# Patient Record
Sex: FEMALE | Race: WHITE | NOT HISPANIC OR LATINO | Employment: OTHER | URBAN - METROPOLITAN AREA
[De-identification: names, ages, dates, MRNs, and addresses within clinical notes are randomized per-mention and may not be internally consistent; named-entity substitution may affect disease eponyms.]

---

## 2017-02-03 ENCOUNTER — ALLSCRIPTS OFFICE VISIT (OUTPATIENT)
Dept: OTHER | Facility: OTHER | Age: 62
End: 2017-02-03

## 2017-02-03 DIAGNOSIS — E55.9 VITAMIN D DEFICIENCY: ICD-10-CM

## 2017-02-03 DIAGNOSIS — R73.01 IMPAIRED FASTING GLUCOSE: ICD-10-CM

## 2017-02-03 DIAGNOSIS — E78.5 HYPERLIPIDEMIA: ICD-10-CM

## 2017-02-03 DIAGNOSIS — M43.10 SPONDYLOLISTHESIS: ICD-10-CM

## 2017-02-13 ENCOUNTER — TRANSCRIBE ORDERS (OUTPATIENT)
Dept: ADMINISTRATIVE | Age: 62
End: 2017-02-13

## 2017-02-13 ENCOUNTER — APPOINTMENT (OUTPATIENT)
Dept: LAB | Age: 62
End: 2017-02-13
Payer: MEDICARE

## 2017-02-13 DIAGNOSIS — M43.10 SPONDYLOLISTHESIS: ICD-10-CM

## 2017-02-13 DIAGNOSIS — R73.01 IMPAIRED FASTING GLUCOSE: ICD-10-CM

## 2017-02-13 DIAGNOSIS — E55.9 VITAMIN D DEFICIENCY: ICD-10-CM

## 2017-02-13 DIAGNOSIS — E78.5 HYPERLIPIDEMIA: ICD-10-CM

## 2017-02-13 LAB
25(OH)D3 SERPL-MCNC: 19.5 NG/ML (ref 30–100)
ALBUMIN SERPL BCP-MCNC: 3.7 G/DL (ref 3.5–5)
ALP SERPL-CCNC: 73 U/L (ref 46–116)
ALT SERPL W P-5'-P-CCNC: 21 U/L (ref 12–78)
ANION GAP SERPL CALCULATED.3IONS-SCNC: 7 MMOL/L (ref 4–13)
AST SERPL W P-5'-P-CCNC: 15 U/L (ref 5–45)
BASOPHILS # BLD AUTO: 0.03 THOUSANDS/ΜL (ref 0–0.1)
BASOPHILS NFR BLD AUTO: 0 % (ref 0–1)
BILIRUB SERPL-MCNC: 0.33 MG/DL (ref 0.2–1)
BUN SERPL-MCNC: 20 MG/DL (ref 5–25)
CALCIUM SERPL-MCNC: 9.3 MG/DL (ref 8.3–10.1)
CHLORIDE SERPL-SCNC: 108 MMOL/L (ref 100–108)
CHOLEST SERPL-MCNC: 273 MG/DL (ref 50–200)
CO2 SERPL-SCNC: 25 MMOL/L (ref 21–32)
CREAT SERPL-MCNC: 1.09 MG/DL (ref 0.6–1.3)
EOSINOPHIL # BLD AUTO: 0.11 THOUSAND/ΜL (ref 0–0.61)
EOSINOPHIL NFR BLD AUTO: 1 % (ref 0–6)
ERYTHROCYTE [DISTWIDTH] IN BLOOD BY AUTOMATED COUNT: 13.3 % (ref 11.6–15.1)
EST. AVERAGE GLUCOSE BLD GHB EST-MCNC: 108 MG/DL
GFR SERPL CREATININE-BSD FRML MDRD: 50.9 ML/MIN/1.73SQ M
GLUCOSE SERPL-MCNC: 107 MG/DL (ref 65–140)
HBA1C MFR BLD: 5.4 % (ref 4.2–6.3)
HCT VFR BLD AUTO: 39.7 % (ref 34.8–46.1)
HDLC SERPL-MCNC: 54 MG/DL (ref 40–60)
HGB BLD-MCNC: 12.7 G/DL (ref 11.5–15.4)
LDLC SERPL CALC-MCNC: 149 MG/DL (ref 0–100)
LYMPHOCYTES # BLD AUTO: 2.49 THOUSANDS/ΜL (ref 0.6–4.47)
LYMPHOCYTES NFR BLD AUTO: 32 % (ref 14–44)
MCH RBC QN AUTO: 30.9 PG (ref 26.8–34.3)
MCHC RBC AUTO-ENTMCNC: 32 G/DL (ref 31.4–37.4)
MCV RBC AUTO: 97 FL (ref 82–98)
MONOCYTES # BLD AUTO: 0.44 THOUSAND/ΜL (ref 0.17–1.22)
MONOCYTES NFR BLD AUTO: 6 % (ref 4–12)
NEUTROPHILS # BLD AUTO: 4.79 THOUSANDS/ΜL (ref 1.85–7.62)
NEUTS SEG NFR BLD AUTO: 61 % (ref 43–75)
NRBC BLD AUTO-RTO: 0 /100 WBCS
PLATELET # BLD AUTO: 322 THOUSANDS/UL (ref 149–390)
PMV BLD AUTO: 9.4 FL (ref 8.9–12.7)
POTASSIUM SERPL-SCNC: 4 MMOL/L (ref 3.5–5.3)
PROT SERPL-MCNC: 7.6 G/DL (ref 6.4–8.2)
RBC # BLD AUTO: 4.11 MILLION/UL (ref 3.81–5.12)
SODIUM SERPL-SCNC: 140 MMOL/L (ref 136–145)
T4 FREE SERPL-MCNC: 0.85 NG/DL (ref 0.76–1.46)
TRIGL SERPL-MCNC: 348 MG/DL
TSH SERPL DL<=0.05 MIU/L-ACNC: 4.26 UIU/ML (ref 0.36–3.74)
WBC # BLD AUTO: 7.87 THOUSAND/UL (ref 4.31–10.16)

## 2017-02-13 PROCEDURE — 80053 COMPREHEN METABOLIC PANEL: CPT

## 2017-02-13 PROCEDURE — 80061 LIPID PANEL: CPT

## 2017-02-13 PROCEDURE — 82306 VITAMIN D 25 HYDROXY: CPT

## 2017-02-13 PROCEDURE — 36415 COLL VENOUS BLD VENIPUNCTURE: CPT

## 2017-02-13 PROCEDURE — 84443 ASSAY THYROID STIM HORMONE: CPT

## 2017-02-13 PROCEDURE — 84439 ASSAY OF FREE THYROXINE: CPT

## 2017-02-13 PROCEDURE — 85025 COMPLETE CBC W/AUTO DIFF WBC: CPT

## 2017-02-13 PROCEDURE — 83036 HEMOGLOBIN GLYCOSYLATED A1C: CPT

## 2017-02-13 PROCEDURE — 86430 RHEUMATOID FACTOR TEST QUAL: CPT

## 2017-02-14 LAB — RHEUMATOID FACT SER QL LA: NEGATIVE

## 2017-03-13 ENCOUNTER — ALLSCRIPTS OFFICE VISIT (OUTPATIENT)
Dept: OTHER | Facility: OTHER | Age: 62
End: 2017-03-13

## 2017-04-14 ENCOUNTER — ALLSCRIPTS OFFICE VISIT (OUTPATIENT)
Dept: OTHER | Facility: OTHER | Age: 62
End: 2017-04-14

## 2017-05-12 ENCOUNTER — ALLSCRIPTS OFFICE VISIT (OUTPATIENT)
Dept: OTHER | Facility: OTHER | Age: 62
End: 2017-05-12

## 2017-05-26 ENCOUNTER — ALLSCRIPTS OFFICE VISIT (OUTPATIENT)
Dept: OTHER | Facility: OTHER | Age: 62
End: 2017-05-26

## 2017-05-26 DIAGNOSIS — M43.10 SPONDYLOLISTHESIS: ICD-10-CM

## 2017-06-28 ENCOUNTER — GENERIC CONVERSION - ENCOUNTER (OUTPATIENT)
Dept: OTHER | Facility: OTHER | Age: 62
End: 2017-06-28

## 2017-06-30 ENCOUNTER — ALLSCRIPTS OFFICE VISIT (OUTPATIENT)
Dept: OTHER | Facility: OTHER | Age: 62
End: 2017-06-30

## 2017-07-27 ENCOUNTER — ALLSCRIPTS OFFICE VISIT (OUTPATIENT)
Dept: OTHER | Facility: OTHER | Age: 62
End: 2017-07-27

## 2017-08-24 ENCOUNTER — ALLSCRIPTS OFFICE VISIT (OUTPATIENT)
Dept: OTHER | Facility: OTHER | Age: 62
End: 2017-08-24

## 2018-01-10 NOTE — PROGRESS NOTES
Assessment    1  Cervical somatic dysfunction (739 1) (M99 01)   2  Thoracic region somatic dysfunction (739 2) (M99 02)   3  Lumbar region somatic dysfunction (739 3) (M99 03)   4  Hyperlipidemia (272 4) (E78 5)    Discussion/Summary    1  Cervical somatic dysfunction  -OMT performed as above, tolerated well  -encouraged hydration to reduce post-treatment soreness  -follow up in 1 month for repeat treatment and re-assessment    2  Thoracic somatic dysfunction  -OMT performed as above, tolerated well  -encouraged hydration to reduce post-treatment soreness  -follow up in 1 month for repeat treatment and re-assessment    3  Lumbar somatic dysfunction  -OMT performed as above, tolerated well  -encouraged hydration to reduce post-treatment soreness  -follow up in 1 month for repeat treatment and re-assessment    4  Hyperlipidemia  -well tolerated, continue Atorvastatin 20mg daily  -total chol 273, HDL 54,  (2/17/17)  -ASCVD risk 7 6%      The patient was counseled regarding instructions for management, risk factor reductions, patient and family education, impressions, risks and benefits of treatment options, importance of compliance with treatment  Possible side effects of new medications were reviewed with the patient/guardian today  The treatment plan was reviewed with the patient/guardian  The patient/guardian understands and agrees with the treatment plan         Self Referrals: No      Chief Complaint  OMT follow visit      History of Present Illness  Patient is a pleasant 58 yo F presenting for follow up visit for OMT  Patient has a h/o degenerative spondylolisthesis and resultant chronic back pain, primarily in low to mid back  Denies bowel or bladder incontinence or saddle paresthesias  She notes stable back tightness/pain, but manageable using combination PT and OMT  At last visit, started patient on Atorvastatin 20mg QHS for HLD   Today she notes no side effects and is taking the medication daily  No other complaints  Review of Systems    Constitutional: no fever and no chills  Cardiovascular: no chest pain and no palpitations  Respiratory: no shortness of breath, no cough, no wheezing and no shortness of breath during exertion  Musculoskeletal: arthralgias, myalgias, lower back pain, mid back pain and upper back pain, but as noted in HPI  ROS reviewed  Active Problems    1  Arthritis (716 90) (M19 90)   2  Cervical somatic dysfunction (739 1) (M99 01)   3  Degenerative spondylolisthesis (738 4) (M43 10)   4  Encounter for smoking cessation counseling (V65 42,305 1) (Z71 6,Z72 0)   5  Fatigue (780 79) (R53 83)   6  Hyperlipidemia (272 4) (E78 5)   7  Impaired fasting glucose (790 21) (R73 01)   8  Lumbar region somatic dysfunction (739 3) (M99 03)   9  Prolapsing Mitral Valve Leaflet Syndrome (424 0)   10  Spinal stenosis (724 00) (M48 00)   11  Subclinical hypothyroidism (244 8) (E03 9)   12  Thoracic region somatic dysfunction (739 2) (M99 02)   13  Vitamin D deficiency (268 9) (E55 9)    Past Medical History    1  History of Ovarian cyst (620 2) (N83 20)    The active problems and past medical history were reviewed and updated today  Surgical History    1  History of Oral Surgery Tooth Extraction    The surgical history was reviewed and updated today  Family History  Maternal Aunt    1  Family history of rheumatoid arthritis (V17 7) (Z82 61)    The family history was reviewed and updated today  Social History    · Current Every Day Smoker (305 1)  The social history was reviewed and updated today  Current Meds   1  Atorvastatin Calcium 20 MG Oral Tablet; TAKE 1 TABLET AT BEDTIME; Therapy: 96TQE0214 to (Simon Medrano)  Requested for: 95HME7123; Last   Rx:07Oar1693 Ordered   2  Fish Oil 1000 MG Oral Capsule; TAKE 1 CAPSULE DAILY; Therapy: 81ZTP3088 to Recorded   3   Vitamin D (Ergocalciferol) 95458 UNIT Oral Capsule; TAKE 1 CAPSULE WEEKLY for 6 weeks; Therapy: 10EIC8329 to (Last Rx:02Mar2015)  Requested for: 71VRP0518 Ordered   4  Vitamin D (Ergocalciferol) 42223 UNIT Oral Capsule; TAKE 1 CAPSULE WEEKLY; Therapy: 42KVW3188 to (Last Rx:64Ukg8767)  Requested for: 06Apr2017; Status: ACTIVE   - Renewal Denied Ordered   5  Vitamin D3 2000 UNIT Oral Tablet; Take 1 tablet daily; Therapy: 94Fjo5080 to (Last Rx:63Pjb7964)  Requested for: 13Apr2015 Ordered    The medication list was reviewed and updated today  Allergies    1  No Known Drug Allergies    Physical Exam    Constitutional   General appearance: No acute distress, well appearing and well nourished  Ears, Nose, Mouth, and Throat   External inspection of ears and nose: Normal     Pulmonary   Respiratory effort: No increased work of breathing or signs of respiratory distress  Psychiatric   Orientation to person, place, and time: Normal     Mood and affect: Normal           Region Evaluated and Treated: Cervical   Examination Method: Tissue Texture Change, Stability, Laxity, Effusions, Tone, Asymmetry, Misalignment, Crepitation, Defects, Masses, Tenderness, Pain and Passive  Severity: Severe  Osteopathic Findings: hypertonic trapezius b/l  Treatment Method: Indirect Treatment, Myofascial Release Treatment and Soft Tissue Treatment  Response: The somatic dysfunction is improved but not completely resolved   Region Evaluated and Treated: Thoracic T1- T4   Examination Method: Tissue Texture Change, Stability, Laxity, Effusions, Tone, Asymmetry, Misalignment, Crepitation, Defects, Masses, Tenderness, Pain and Passive  Severity: Moderate, Severe  Osteopathic Findings: hypertonic paraspinals b/l  Treatment Method: Indirect Treatment, Myofascial Release Treatment and Soft Tissue Treatment  Response:  The somatic dysfunction is improved but not completely resolved   Region Evaluated and Treated: Thoracic T5 - T9   Examination Method: Tissue Texture Change, Stability, Laxity, Effusions, Tone, Asymmetry, Misalignment, Crepitation, Defects, Masses, Tenderness, Pain and Passive  Severity: Severe  Osteopathic Findings: hypertonic paraspinals b/l - R>L  Treatment Method: Indirect Treatment, Myofascial Release Treatment and Soft Tissue Treatment  Response: The somatic dysfunction is improved but not completely resolved   Region Evaluated and Treated: Thoracic T10 - T12   Examination Method: Tissue Texture Change, Stability, Laxity, Effusions, Tone, Asymmetry, Misalignment, Crepitation, Defects, Masses, Tenderness, Pain and Passive  Severity: Severe  Osteopathic Findings: hypertonic paraspinals b/l, R>L  Treatment Method: Indirect Treatment, Myofascial Release Treatment and Soft Tissue Treatment  Response: The somatic dysfunction is improved but not completely resolved   Region Evaluated and Treated: Lumbar   Examination Method: Tissue Texture Change, Stability, Laxity, Effusions, Tone, Asymmetry, Misalignment, Crepitation, Defects, Masses, Tenderness, Pain and Passive  Severity: Moderate  Osteopathic Findings: hypertonic paraspinals b/l  Treatment Method: Indirect Treatment, Myofascial Release Treatment and Soft Tissue Treatment  Response: The somatic dysfunction is improved but not completely resolved      Attending Note  Attending Note: I discussed the case with the Resident and reviewed the Resident's note, I supervised the Resident and I agree with the Resident management plan as it was presented to me  Level of Participation: I was present in clinic, but did not examine the patient  Comments/Additional Findings: cervical, thoracic, lumbar somatic dysfunction; improved with OMT  I agree with the Resident's note        Future Appointments    Date/Time Provider Specialty Site   07/27/2017 01:00 PM Tran Lebron [facility]   08/24/2017 01:00 PM Deidre White DO Family Medicine [address]     Signatures   Electronically signed by : Renaldo Lynn DO; Jun 30 2017 2:13PM EST                       (Author)    Electronically signed by : AGUSTIN Tena ; Jun 30 2017  2:22PM EST                       (Author)

## 2018-01-10 NOTE — PROGRESS NOTES
Assessment    1  Cervical somatic dysfunction (739 1) (M99 01)   2  Lumbar region somatic dysfunction (739 3) (M99 03)   3  Thoracic region somatic dysfunction (739 2) (M99 02)   4  Hyperlipidemia (272 4) (E78 5)    Plan  Degenerative spondylolisthesis    · * DXA BONE DENSITY SPINE HIP AND PELVIS; Status:Hold For - Scheduling; Requested  for:35Ccn3382;   Hyperlipidemia    · Atorvastatin Calcium 20 MG Oral Tablet; TAKE 1 TABLET AT BEDTIME    Discussion/Summary    1  Cervical somatic dysfunction  -OMT performed as above, tolerated well  -encouraged hydration to reduce post-treatment soreness  -follow up in 1 month for repeat treatment and re-assessment    2  Thoracic somatic dysfunction  -OMT performed as above, tolerated well  -encouraged hydration to reduce post-treatment soreness  -follow up in 1 month for repeat treatment and re-assessment    3  Lumbar somatic dysfunction  -OMT performed as above, tolerated well  -encouraged hydration to reduce post-treatment soreness  -follow up in 1 month for repeat treatment and re-assessment    4  Hyperlipidemia  -start Atorvastatin 20mg daily  -total chol 273, HDL 54,  (2/17/17)  -ASCVD risk 7 6%  -follow up at next OMT visit to assess response to treatment and side effects  Will start on lower dose and go up as appropriate given h/o myalgias at baseline  The patient was counseled regarding instructions for management, risk factor reductions, patient and family education, impressions, risks and benefits of treatment options, importance of compliance with treatment  Possible side effects of new medications were reviewed with the patient/guardian today  The treatment plan was reviewed with the patient/guardian  The patient/guardian understands and agrees with the treatment plan         Self Referrals: No      Chief Complaint  OMT follow visit      History of Present Illness  Patient is a pleasant 58 yo F presenting for follow up visit for OMT   Patient has a h/o degenerative spondylolisthesis and resultant chronic back pain, primarily in low to mid back  Denies bowel or bladder incontinence or saddle paresthesias  She has been following with physical therapy twice weekly with [facility] and notes improvement in her back pain with combo of PT and OMT  She follows with a rheumatologist and was recently started on Cymbalta  Review of Systems    Constitutional: no fever and no chills  Cardiovascular: no chest pain and no palpitations  Respiratory: no shortness of breath, no cough, no wheezing and no shortness of breath during exertion  Musculoskeletal: arthralgias, myalgias, lower back pain, mid back pain and upper back pain, but as noted in HPI  ROS reviewed  Active Problems    1  Arthritis (716 90) (M19 90)   2  Cervical somatic dysfunction (739 1) (M99 01)   3  Degenerative spondylolisthesis (738 4) (M43 10)   4  Encounter for smoking cessation counseling (V65 42,305 1) (Z71 6,Z72 0)   5  Fatigue (780 79) (R53 83)   6  Hyperlipidemia (272 4) (E78 5)   7  Impaired fasting glucose (790 21) (R73 01)   8  Lumbar region somatic dysfunction (739 3) (M99 03)   9  Prolapsing Mitral Valve Leaflet Syndrome (424 0)   10  Spinal stenosis (724 00) (M48 00)   11  Subclinical hypothyroidism (244 8) (E03 9)   12  Thoracic region somatic dysfunction (739 2) (M99 02)   13  Vitamin D deficiency (268 9) (E55 9)    Past Medical History    1  History of Ovarian cyst (620 2) (N83 20)    The active problems and past medical history were reviewed and updated today  Surgical History    1  History of Oral Surgery Tooth Extraction    The surgical history was reviewed and updated today  Family History  Maternal Aunt    1  Family history of rheumatoid arthritis (V17 7) (Z82 61)    The family history was reviewed and updated today  Social History    · Current Every Day Smoker (305 1)  The social history was reviewed and updated today        Current Meds   1  Fish Oil 1000 MG Oral Capsule; TAKE 1 CAPSULE DAILY; Therapy: 70LLI0855 to Recorded   2  Vitamin D (Ergocalciferol) 29324 UNIT Oral Capsule; TAKE 1 CAPSULE WEEKLY for 6   weeks; Therapy: 86NWJ5402 to (Last Rx:02Mar2015)  Requested for: 99SYN5375 Ordered   3  Vitamin D (Ergocalciferol) 39661 UNIT Oral Capsule; TAKE 1 CAPSULE WEEKLY; Therapy: 92HJE1517 to (Last Rx:40Ucs1045)  Requested for: 06Apr2017; Status: ACTIVE   - Renewal Denied Ordered   4  Vitamin D3 2000 UNIT Oral Tablet; Take 1 tablet daily; Therapy: 59Qcf9418 to (Last Rx:17Ylq4777)  Requested for: 52Gfq4188 Ordered    The medication list was reviewed and updated today  Allergies    1  No Known Drug Allergies    Physical Exam    Constitutional   General appearance: No acute distress, well appearing and well nourished  Ears, Nose, Mouth, and Throat   External inspection of ears and nose: Normal     Pulmonary   Respiratory effort: No increased work of breathing or signs of respiratory distress  Psychiatric   Orientation to person, place, and time: Normal     Mood and affect: Normal           Region Evaluated and Treated: Cervical   Examination Method: Tissue Texture Change, Stability, Laxity, Effusions, Tone, Asymmetry, Misalignment, Crepitation, Defects, Masses, Tenderness, Pain and Passive  Severity: Severe  Osteopathic Findings: hypertonic trapezius b/l  Treatment Method: Indirect Treatment, Myofascial Release Treatment and Soft Tissue Treatment  Response: The somatic dysfunction is improved but not completely resolved   Region Evaluated and Treated: Thoracic T1- T4   Examination Method: Tissue Texture Change, Stability, Laxity, Effusions, Tone, Asymmetry, Misalignment, Crepitation, Defects, Masses, Tenderness, Pain and Passive  Severity: Moderate, Severe  Osteopathic Findings: hypertonic paraspinals b/l  Treatment Method: Indirect Treatment, Myofascial Release Treatment and Soft Tissue Treatment  Response:  The somatic dysfunction is improved but not completely resolved   Region Evaluated and Treated: Thoracic T5 - T9   Examination Method: Tissue Texture Change, Stability, Laxity, Effusions, Tone, Asymmetry, Misalignment, Crepitation, Defects, Masses, Tenderness, Pain and Passive  Severity: Moderate  Osteopathic Findings: hypertonic paraspinals b/l - R>L  Treatment Method: Indirect Treatment, Myofascial Release Treatment and Soft Tissue Treatment  Response: The somatic dysfunction is improved but not completely resolved   Region Evaluated and Treated: Thoracic T10 - T12   Examination Method: Tissue Texture Change, Stability, Laxity, Effusions, Tone, Asymmetry, Misalignment, Crepitation, Defects, Masses, Tenderness, Pain and Passive  Severity: Moderate  Osteopathic Findings: hypertonic paraspinals b/l, R>L  Treatment Method: Indirect Treatment, Myofascial Release Treatment and Soft Tissue Treatment  Response: The somatic dysfunction is improved but not completely resolved   Region Evaluated and Treated: Lumbar   Examination Method: Tissue Texture Change, Stability, Laxity, Effusions, Tone, Asymmetry, Misalignment, Crepitation, Defects, Masses, Tenderness, Pain and Passive  Severity: Moderate  Osteopathic Findings: hypertonic paraspinals b/l  Treatment Method: Indirect Treatment, Myofascial Release Treatment and Soft Tissue Treatment  Response: The somatic dysfunction is improved but not completely resolved      Attending Note  Attending Note: I discussed the case with the Resident and reviewed the Resident's note and I agree with the Resident management plan as it was presented to me  Level of Participation: I was present in clinic, but did not examine the patient  Diagnosis and Plan: Neck and back pain: OMT done, patient tolerated well  I agree with the Resident's note        Future Appointments    Date/Time Provider Specialty Site   06/30/2017 01:20 PM Digna Mosqueda[address]   07/27/2017 01:00 PM Gail , [address]   2017 01:00 PM Gail Sauceda DO [address]     Signatures   Electronically signed by : Mena Samaniego DO; May 26 2017  3:40PM EST                       (Author)    Electronically signed by : AGUSTIN Briseno ; May 26 2017  3:52PM EST                       (Author)

## 2018-01-12 VITALS
HEIGHT: 64 IN | WEIGHT: 132.25 LBS | BODY MASS INDEX: 22.58 KG/M2 | SYSTOLIC BLOOD PRESSURE: 122 MMHG | DIASTOLIC BLOOD PRESSURE: 78 MMHG

## 2018-01-14 NOTE — PROGRESS NOTES
Assessment    1  Cervical somatic dysfunction (739 1) (M99 01)   2  Lumbar region somatic dysfunction (739 3) (M99 03)   3  Thoracic region somatic dysfunction (739 2) (M99 02)    Discussion/Summary    1  Cervical somatic dysfunction  -OMT performed as above, tolerated well  -encouraged hydration to reduce post-treatment soreness  -follow up in 1 month for repeat treatment and re-assessment    2  Thoracic somatic dysfunction  -OMT performed as above, tolerated well  -encouraged hydration to reduce post-treatment soreness  -follow up in 1 month for repeat treatment and re-assessment    3  Lumbar somatic dysfunction  -OMT performed as above, tolerated well  -encouraged hydration to reduce post-treatment soreness  -follow up in 1 month for repeat treatment and re-assessment  The patient was counseled regarding instructions for management, risk factor reductions, patient and family education, impressions, risks and benefits of treatment options, importance of compliance with treatment  Possible side effects of new medications were reviewed with the patient/guardian today  The treatment plan was reviewed with the patient/guardian  The patient/guardian understands and agrees with the treatment plan         Self Referrals: No      Chief Complaint  OMT follow visit      History of Present Illness  Patient is a pleasant 58 yo F presenting for follow up visit for OMT  Patient has a h/o degenerative spondylolisthesis and resultant chronic back pain, primarily in low to mid back  Denies bowel or bladder incontinence or saddle paresthesias  She has been following with physical therapy twice weekly and notes improvement in her back pain with combo of PT and OMT  Notes flare up for shoulder/neck pain when she was unable to go to PT last week, now improved  Review of Systems    Constitutional: no fever and no chills  Cardiovascular: no chest pain and no palpitations     Respiratory: no shortness of breath, no cough, no wheezing and no shortness of breath during exertion  Musculoskeletal: arthralgias, myalgias, lower back pain, mid back pain and upper back pain, but as noted in HPI  ROS reviewed  Active Problems    1  Arthritis (716 90) (M19 90)   2  Cervical somatic dysfunction (739 1) (M99 01)   3  Degenerative spondylolisthesis (738 4) (M43 10)   4  Encounter for smoking cessation counseling (V65 42,305 1) (Z71 6,Z72 0)   5  Fatigue (780 79) (R53 83)   6  Hyperlipidemia (272 4) (E78 5)   7  Impaired fasting glucose (790 21) (R73 01)   8  Lumbar region somatic dysfunction (739 3) (M99 03)   9  Prolapsing Mitral Valve Leaflet Syndrome (424 0)   10  Spinal stenosis (724 00) (M48 00)   11  Subclinical hypothyroidism (244 8) (E03 9)   12  Thoracic region somatic dysfunction (739 2) (M99 02)   13  Vitamin D deficiency (268 9) (E55 9)    Past Medical History    1  History of Ovarian cyst (620 2) (N83 20)    The active problems and past medical history were reviewed and updated today  Surgical History    1  History of Oral Surgery Tooth Extraction    The surgical history was reviewed and updated today  Family History  Maternal Aunt    1  Family history of rheumatoid arthritis (V17 7) (Z82 61)    The family history was reviewed and updated today  Social History    · Current Every Day Smoker (305 1)  The social history was reviewed and updated today  Current Meds   1  Fish Oil 1000 MG Oral Capsule; TAKE 1 CAPSULE DAILY; Therapy: 16VDP3875 to Recorded   2  Vitamin D (Ergocalciferol) 03610 UNIT Oral Capsule; TAKE 1 CAPSULE WEEKLY for 6   weeks; Therapy: 41WPJ8841 to (Last Rx:02Mar2015)  Requested for: 08NSF4300 Ordered   3  Vitamin D (Ergocalciferol) 26903 UNIT Oral Capsule; TAKE 1 CAPSULE WEEKLY; Therapy: 20AOR7556 to (Last Rx:53Wkk9065)  Requested for: 06Apr2017; Status: ACTIVE   - Renewal Denied Ordered   4  Vitamin D3 2000 UNIT Oral Tablet; Take 1 tablet daily;    Therapy: 89Nne2145 to (Last Rx:95Gwi5200)  Requested for: 23Kzd5893 Ordered    The medication list was reviewed and updated today  Allergies    1  No Known Drug Allergies    Physical Exam    Constitutional   General appearance: No acute distress, well appearing and well nourished  Ears, Nose, Mouth, and Throat   External inspection of ears and nose: Normal     Pulmonary   Respiratory effort: No increased work of breathing or signs of respiratory distress  Psychiatric   Orientation to person, place, and time: Normal     Mood and affect: Normal         Region Evaluated and Treated: Cervical   Examination Method: Tissue Texture Change, Stability, Laxity, Effusions, Tone, Asymmetry, Misalignment, Crepitation, Defects, Masses, Tenderness, Pain and Passive  Severity: Severe  Osteopathic Findings: hypertonic trapezius b/l  Treatment Method: Indirect Treatment, Myofascial Release Treatment and Soft Tissue Treatment  Response: The somatic dysfunction is improved but not completely resolved   Region Evaluated and Treated: Thoracic T1- T4   Examination Method: Tissue Texture Change, Stability, Laxity, Effusions, Tone, Asymmetry, Misalignment, Crepitation, Defects, Masses, Tenderness, Pain and Passive  Severity: Moderate, Severe  Osteopathic Findings: hypertonic paraspinals b/l  Treatment Method: Indirect Treatment, Myofascial Release Treatment and Soft Tissue Treatment  Response: The somatic dysfunction is improved but not completely resolved   Region Evaluated and Treated: Thoracic T5 - T9   Examination Method: Tissue Texture Change, Stability, Laxity, Effusions, Tone, Asymmetry, Misalignment, Crepitation, Defects, Masses, Tenderness, Pain and Passive  Severity: Moderate  Osteopathic Findings: hypertonic paraspinals b/l - R>L  Treatment Method: Indirect Treatment, Myofascial Release Treatment and Soft Tissue Treatment  Response:  The somatic dysfunction is improved but not completely resolved   Region Evaluated and Treated: Thoracic T10 - T12   Examination Method: Tissue Texture Change, Stability, Laxity, Effusions, Tone, Asymmetry, Misalignment, Crepitation, Defects, Masses, Tenderness, Pain and Passive  Severity: Severe  Osteopathic Findings: hypertonic paraspinals b/l, R>L  Treatment Method: Indirect Treatment, Myofascial Release Treatment and Soft Tissue Treatment  Response: The somatic dysfunction is improved but not completely resolved   Region Evaluated and Treated: Lumbar   Examination Method: Tissue Texture Change, Stability, Laxity, Effusions, Tone, Asymmetry, Misalignment, Crepitation, Defects, Masses, Tenderness, Pain and Passive  Severity: Moderate  Osteopathic Findings: hypertonic paraspinals b/l  Treatment Method: Indirect Treatment, Myofascial Release Treatment and Soft Tissue Treatment  Response: The somatic dysfunction is improved but not completely resolved      Attending Note  Attending Note: I discussed the case with the Resident and reviewed the Resident's note and I agree with the Resident management plan as it was presented to me  Level of Participation: I was present in clinic, but did not examine the patient  Diagnosis and Plan: Neck and back pain: OMT done; patient tolerated well  I agree with the Resident's note        Future Appointments    Date/Time Provider Specialty Site   05/26/2017 02:40 PM Ismael Morales DO Family Medicine [address]   06/30/2017 01:20 PM Ismael Morales [address] [place] [address]   07/27/2017 01:00 PM Ismael Morales [address] [place] [address]   08/24/2017 01:00 PM Ismael Morales, [address] [place] [address]     Signatures   Electronically signed by : Vincenzo Duverney, DO; May 12 2017  1:44PM EST                       (Author)    Electronically signed by : AGUSTIN Giron ; May 12 2017  2:12PM EST                       (Author)

## 2018-01-15 VITALS
WEIGHT: 132 LBS | HEIGHT: 64 IN | TEMPERATURE: 98.6 F | DIASTOLIC BLOOD PRESSURE: 70 MMHG | HEART RATE: 80 BPM | RESPIRATION RATE: 16 BRPM | BODY MASS INDEX: 22.53 KG/M2 | SYSTOLIC BLOOD PRESSURE: 138 MMHG

## 2018-01-15 NOTE — PROGRESS NOTES
Assessment    1  Cervical somatic dysfunction (739 1) (M99 01)   2  Lumbar region somatic dysfunction (739 3) (M99 03)   3  Thoracic region somatic dysfunction (739 2) (M99 02)    Discussion/Summary    1  Cervical somatic dysfunction  -OMT performed as above, tolerated well  -encouraged hydration to reduce post-treatment soreness  -follow up in 1 month for repeat treatment and re-assessment    2  Thoracic somatic dysfunction  -OMT performed as above, tolerated well  -encouraged hydration to reduce post-treatment soreness  -follow up in 1 month for repeat treatment and re-assessment    3  Lumbar somatic dysfunction  -OMT performed as above, tolerated well  -encouraged hydration to reduce post-treatment soreness  -follow up in 1 month for repeat treatment and re-assessment  The patient was counseled regarding instructions for management, risk factor reductions, patient and family education, impressions, risks and benefits of treatment options, importance of compliance with treatment  Possible side effects of new medications were reviewed with the patient/guardian today  The treatment plan was reviewed with the patient/guardian  The patient/guardian understands and agrees with the treatment plan         Self Referrals: No      Chief Complaint  OMT follow visit      History of Present Illness  Patient is a pleasant 58 yo F presenting for follow up visit for OMT  Patient has a h/o degenerative spondylolisthesis and resultant chronic back pain, primarily in low to mid back  Denies bowel or bladder incontinence or saddle paresthesias  She notes stable back tightness/pain, but manageable using OMT  Patient has run out of PT sessions, but does stretching exercises at home  Notes HA today, but no other acute complaints  Review of Systems    Constitutional: no fever and no chills  Cardiovascular: no chest pain and no palpitations     Respiratory: no shortness of breath, no cough, no wheezing and no shortness of breath during exertion  Musculoskeletal: arthralgias, myalgias, lower back pain, mid back pain and upper back pain, but as noted in HPI  Neurological: headache, but no difficulty walking  ROS reviewed  Active Problems    1  Arthritis (716 90) (M19 90)   2  Cervical somatic dysfunction (739 1) (M99 01)   3  Degenerative spondylolisthesis (738 4) (M43 10)   4  Encounter for smoking cessation counseling (V65 42,305 1) (Z71 6,Z72 0)   5  Fatigue (780 79) (R53 83)   6  Hyperlipidemia (272 4) (E78 5)   7  Impaired fasting glucose (790 21) (R73 01)   8  Lumbar region somatic dysfunction (739 3) (M99 03)   9  Prolapsing Mitral Valve Leaflet Syndrome (424 0)   10  Spinal stenosis (724 00) (M48 00)   11  Subclinical hypothyroidism (244 8) (E03 9)   12  Thoracic region somatic dysfunction (739 2) (M99 02)   13  Vitamin D deficiency (268 9) (E55 9)    Past Medical History    1  History of Ovarian cyst (620 2) (N83 20)    The active problems and past medical history were reviewed and updated today  Surgical History    1  History of Oral Surgery Tooth Extraction    The surgical history was reviewed and updated today  Family History  Maternal Aunt    1  Family history of rheumatoid arthritis (V17 7) (Z82 61)    The family history was reviewed and updated today  Social History    · Current Every Day Smoker (305 1)  The social history was reviewed and updated today  Current Meds   1  Atorvastatin Calcium 20 MG Oral Tablet; TAKE 1 TABLET AT BEDTIME; Therapy: 68YPO2182 to (Evelina Ambrosia)  Requested for: 96CRD0250; Last   Rx:91Yku3227 Ordered   2  Fish Oil 1000 MG Oral Capsule; TAKE 1 CAPSULE DAILY; Therapy: 85SWE1927 to Recorded   3  Vitamin D (Ergocalciferol) 96658 UNIT Oral Capsule; TAKE 1 CAPSULE WEEKLY for 6   weeks; Therapy: 02KLM7413 to (Last Rx:02Mar2015)  Requested for: 81QWL5951 Ordered   4   Vitamin D (Ergocalciferol) 70775 UNIT Oral Capsule; TAKE 1 CAPSULE WEEKLY; Therapy: 41GSM2649 to (Last Rx:69Bzu5910)  Requested for: 06Apr2017; Status: ACTIVE   - Renewal Denied Ordered   5  Vitamin D3 2000 UNIT Oral Tablet; Take 1 tablet daily; Therapy: 13Apr2015 to (Last Rx:51Cfx3811)  Requested for: 13Apr2015 Ordered    The medication list was reviewed and updated today  Allergies    1  No Known Drug Allergies    Physical Exam    Constitutional   General appearance: No acute distress, well appearing and well nourished  Ears, Nose, Mouth, and Throat   External inspection of ears and nose: Normal     Pulmonary   Respiratory effort: No increased work of breathing or signs of respiratory distress  Psychiatric   Orientation to person, place, and time: Normal     Mood and affect: Normal           Region Evaluated and Treated: Cervical   Examination Method: Tissue Texture Change, Stability, Laxity, Effusions, Tone, Asymmetry, Misalignment, Crepitation, Defects, Masses, Tenderness, Pain and Passive  Severity: Severe  Osteopathic Findings: hypertonic trapezius b/l, R>L  Treatment Method: Indirect Treatment, Myofascial Release Treatment and Soft Tissue Treatment  Response: The somatic dysfunction is improved but not completely resolved   Region Evaluated and Treated: Thoracic T1- T4   Examination Method: Tissue Texture Change, Stability, Laxity, Effusions, Tone, Asymmetry, Misalignment, Crepitation, Defects, Masses, Tenderness, Pain and Passive  Severity: Moderate  Osteopathic Findings: hypertonic paraspinals b/l  Treatment Method: Indirect Treatment, Myofascial Release Treatment and Soft Tissue Treatment  Response:  The somatic dysfunction is improved but not completely resolved   Region Evaluated and Treated: Thoracic T5 - T9   Examination Method: Tissue Texture Change, Stability, Laxity, Effusions, Tone, Asymmetry, Misalignment, Crepitation, Defects, Masses, Tenderness, Pain and Passive  Severity: Severe  Osteopathic Findings: hypertonic paraspinals b/l - R>L  Treatment Method: Indirect Treatment, Myofascial Release Treatment and Soft Tissue Treatment  Response: The somatic dysfunction is improved but not completely resolved   Region Evaluated and Treated: Thoracic T10 - T12   Examination Method: Tissue Texture Change, Stability, Laxity, Effusions, Tone, Asymmetry, Misalignment, Crepitation, Defects, Masses, Tenderness, Pain and Passive  Severity: Severe  Osteopathic Findings: hypertonic paraspinals b/l, R>L  Treatment Method: Indirect Treatment, Myofascial Release Treatment and Soft Tissue Treatment  Response: The somatic dysfunction is improved but not completely resolved   Region Evaluated and Treated: Lumbar   Examination Method: Tissue Texture Change, Stability, Laxity, Effusions, Tone, Asymmetry, Misalignment, Crepitation, Defects, Masses, Tenderness, Pain and Passive  Severity: Moderate  Osteopathic Findings: hypertonic paraspinals b/l  Treatment Method: Indirect Treatment, Myofascial Release Treatment and Soft Tissue Treatment  Response:  The somatic dysfunction is improved but not completely resolved      Future Appointments    Date/Time Provider Specialty Site   08/24/2017 01:00 PM Mitzi Denise DO [address]   09/25/2017 01:20 PM Mitzi Denise [address] [place] [address]   10/24/2017 01:20 PM Mitzi Denise [address] [place] [address]   11/21/2017 02:00 PM Mitzi Denise [address] [place] [address]     Signatures   Electronically signed by : Kg Gallo DO; Jul 27 2017  1:49PM EST                       (Author)    Electronically signed by : AGUSTIN Lyon ; Jul 31 2017  2:03PM EST                       (Review)

## 2018-01-16 NOTE — PROGRESS NOTES
Assessment    1  Cervical somatic dysfunction (739 1) (M99 01)   2  Thoracic region somatic dysfunction (739 2) (M99 02)   3  Lumbar region somatic dysfunction (739 3) (M99 03)    Discussion/Summary    1  Cervical somatic dysfunction  -OMT performed as above, tolerated well  -encouraged hydration to reduce post-treatment soreness  -follow up in 2-3 weeks for repeat treatment and re-assessment    2  Thoracic somatic dysfunction  -OMT performed as above, tolerated well  -encouraged hydration to reduce post-treatment soreness  -follow up in 2-3 weeks for repeat treatment and re-assessment    3  Lumbar somatic dysfunction  -OMT performed as above, tolerated well  -encouraged hydration to reduce post-treatment soreness  -follow up in 2-3 weeks for repeat treatment and re-assessment  The patient was counseled regarding instructions for management, risk factor reductions, patient and family education, impressions, risks and benefits of treatment options, importance of compliance with treatment  Possible side effects of new medications were reviewed with the patient/guardian today  The treatment plan was reviewed with the patient/guardian  The patient/guardian understands and agrees with the treatment plan         Self Referrals: No      Chief Complaint  OMT follow visit      History of Present Illness  Patient is a pleasant 56 yo F presenting for follow up visit for OMT  Patient has a h/o degenerative spondylolisthesis and resultant chronic back pain, primarily in low to mid back  Denies bowel or bladder incontinence or saddle paresthesias  She notes soreness after last visit for 1-2 days and overall mildly improved pain since last visit  Review of Systems    Constitutional: no fever and no chills  Cardiovascular: no chest pain and no palpitations  Respiratory: no shortness of breath, no cough, no wheezing and no shortness of breath during exertion     Musculoskeletal: arthralgias, myalgias, lower back pain, mid back pain and upper back pain, but as noted in HPI  ROS reviewed  Active Problems    1  Arthritis (716 90) (M19 90)   2  Cervical somatic dysfunction (739 1) (M99 01)   3  Degenerative spondylolisthesis (738 4) (M43 10)   4  Encounter for smoking cessation counseling (V65 42,305 1) (Z71 6,Z72 0)   5  Fatigue (780 79) (R53 83)   6  Hyperlipidemia (272 4) (E78 5)   7  Impaired fasting glucose (790 21) (R73 01)   8  Lumbar region somatic dysfunction (739 3) (M99 03)   9  Prolapsing Mitral Valve Leaflet Syndrome (424 0)   10  Spinal stenosis (724 00) (M48 00)   11  Subclinical hypothyroidism (244 8) (E03 9)   12  Thoracic region somatic dysfunction (739 2) (M99 02)   13  Vitamin D deficiency (268 9) (E55 9)    Past Medical History    1  History of Ovarian cyst (620 2) (N83 20)    The active problems and past medical history were reviewed and updated today  Surgical History    1  History of Oral Surgery Tooth Extraction    The surgical history was reviewed and updated today  Family History  Maternal Aunt    1  Family history of rheumatoid arthritis (V17 7) (Z82 61)    The family history was reviewed and updated today  Social History    · Current Every Day Smoker (305 1)  The social history was reviewed and updated today  Current Meds   1  Fish Oil 1000 MG Oral Capsule; TAKE 1 CAPSULE DAILY; Therapy: 14YUN3298 to Recorded   2  Vitamin D (Ergocalciferol) 62342 UNIT Oral Capsule; TAKE 1 CAPSULE WEEKLY for 6   weeks; Therapy: 69LUG6943 to (Last Rx:02Mar2015)  Requested for: 34VDH6879 Ordered   3  Vitamin D (Ergocalciferol) 58608 UNIT Oral Capsule; TAKE 1 CAPSULE WEEKLY; Therapy: 81GST3308 to (Last Rx:01Eog6310)  Requested for: 06Apr2017; Status: ACTIVE   - Renewal Denied Ordered   4  Vitamin D3 2000 UNIT Oral Tablet; Take 1 tablet daily; Therapy: 62Jjo5934 to (Last Rx:83Hch4605)  Requested for: 78Rbx0702 Ordered    The medication list was reviewed and updated today  Allergies    1  No Known Drug Allergies    Physical Exam    Constitutional   General appearance: No acute distress, well appearing and well nourished  Ears, Nose, Mouth, and Throat   External inspection of ears and nose: Normal     Pulmonary   Respiratory effort: No increased work of breathing or signs of respiratory distress  Psychiatric   Orientation to person, place, and time: Normal     Mood and affect: Normal           Region Evaluated and Treated: Cervical   Examination Method: Tissue Texture Change, Stability, Laxity, Effusions, Tone, Asymmetry, Misalignment, Crepitation, Defects, Masses, Tenderness, Pain and Passive  Severity: Severe  Osteopathic Findings: hypertonic trapezius b/l - R>L  Treatment Method: Indirect Treatment, Myofascial Release Treatment and Soft Tissue Treatment  Response: The somatic dysfunction is improved but not completely resolved   Region Evaluated and Treated: Thoracic T1- T4   Examination Method: Tissue Texture Change, Stability, Laxity, Effusions, Tone, Asymmetry, Misalignment, Crepitation, Defects, Masses, Tenderness, Pain and Passive  Severity: Moderate  Osteopathic Findings: hypertonic paraspinals b/l  Treatment Method: Indirect Treatment, Myofascial Release Treatment and Soft Tissue Treatment  Response: The somatic dysfunction is improved but not completely resolved   Region Evaluated and Treated: Thoracic T5 - T9   Examination Method: Tissue Texture Change, Stability, Laxity, Effusions, Tone, Asymmetry, Misalignment, Crepitation, Defects, Masses, Tenderness, Pain and Passive  Severity: Moderate  Osteopathic Findings: hypertonic paraspinals b/l - R>L  Treatment Method: Indirect Treatment, Myofascial Release Treatment and Soft Tissue Treatment  Response:  The somatic dysfunction is improved but not completely resolved   Region Evaluated and Treated: Thoracic T10 - T12   Examination Method: Tissue Texture Change, Stability, Laxity, Effusions, Tone, Asymmetry, Misalignment, Crepitation, Defects, Masses, Tenderness, Pain and Passive  Severity: Severe  Osteopathic Findings: hypertonic paraspinals b/l, R>L  Treatment Method: Indirect Treatment, Myofascial Release Treatment and Soft Tissue Treatment  Response: The somatic dysfunction is improved but not completely resolved   Region Evaluated and Treated: Lumbar   Examination Method: Tissue Texture Change, Stability, Laxity, Effusions, Tone, Asymmetry, Misalignment, Crepitation, Defects, Masses, Tenderness, Pain and Passive  Severity: Moderate  Osteopathic Findings: hypertonic paraspinals b/l  Treatment Method: Indirect Treatment, Myofascial Release Treatment and Soft Tissue Treatment  Response: The somatic dysfunction is improved but not completely resolved      Future Appointments    Date/Time Provider Specialty Site   05/02/2017 02:40 PM Waylon Just, [id number] [place] [address]   05/12/2017 01:00 PM Warren Just, [address] [place] [address] Ct   05/26/2017 02:40 PM Waylon Just, [address] [place] [address] Ct     Signatures   Electronically signed by : Hyacinth Palomo DO;  Apr 16 2017  8:41AM EST                       (Author)    Electronically signed by : AGUSTIN Corea ; Apr 18 2017  6:04PM EST                       (Review)

## 2018-07-05 ENCOUNTER — OFFICE VISIT (OUTPATIENT)
Dept: FAMILY MEDICINE CLINIC | Facility: CLINIC | Age: 63
End: 2018-07-05
Payer: COMMERCIAL

## 2018-07-05 VITALS
DIASTOLIC BLOOD PRESSURE: 76 MMHG | BODY MASS INDEX: 21.58 KG/M2 | HEART RATE: 84 BPM | RESPIRATION RATE: 16 BRPM | TEMPERATURE: 97.3 F | WEIGHT: 121.8 LBS | HEIGHT: 63 IN | SYSTOLIC BLOOD PRESSURE: 120 MMHG

## 2018-07-05 DIAGNOSIS — M62.838 TRAPEZIUS MUSCLE SPASM: ICD-10-CM

## 2018-07-05 DIAGNOSIS — Z23 NEED FOR TDAP VACCINATION: Primary | ICD-10-CM

## 2018-07-05 DIAGNOSIS — Z12.31 ENCOUNTER FOR SCREENING MAMMOGRAM FOR BREAST CANCER: ICD-10-CM

## 2018-07-05 DIAGNOSIS — Z12.11 SCREENING FOR COLON CANCER: ICD-10-CM

## 2018-07-05 PROCEDURE — 3725F SCREEN DEPRESSION PERFORMED: CPT | Performed by: FAMILY MEDICINE

## 2018-07-05 PROCEDURE — 3008F BODY MASS INDEX DOCD: CPT | Performed by: FAMILY MEDICINE

## 2018-07-05 PROCEDURE — 99213 OFFICE O/P EST LOW 20 MIN: CPT | Performed by: FAMILY MEDICINE

## 2018-07-05 RX ORDER — ACETAMINOPHEN 160 MG
1 TABLET,DISINTEGRATING ORAL DAILY
COMMUNITY
Start: 2015-04-13

## 2018-07-05 RX ORDER — CHLORAL HYDRATE 500 MG
1 CAPSULE ORAL DAILY
COMMUNITY
Start: 2013-05-09

## 2018-07-05 RX ORDER — CYCLOBENZAPRINE HCL 10 MG
10 TABLET ORAL 3 TIMES DAILY PRN
Qty: 30 TABLET | Refills: 0 | Status: SHIPPED | OUTPATIENT
Start: 2018-07-05

## 2018-07-05 NOTE — PROGRESS NOTES
Assessment/Plan:    Problem List Items Addressed This Visit        Musculoskeletal and Integument    Trapezius muscle spasm       One-week history,  No trauma,  Neurovascular intact  Will give Flexeril   ibuprofen for week   follow-up in a week  If no improvement was sent for PT         Relevant Medications    cyclobenzaprine (FLEXERIL) 10 mg tablet      Other Visit Diagnoses     Need for Tdap vaccination    -  Primary    Relevant Medications    cyclobenzaprine (FLEXERIL) 10 mg tablet    Other Relevant Orders    TDAP VACCINE GREATER THAN OR EQUAL TO 6YO IM    Encounter for screening mammogram for breast cancer        Relevant Medications    cyclobenzaprine (FLEXERIL) 10 mg tablet    Other Relevant Orders    Mammo screening bilateral w cad    Screening for colon cancer        Relevant Orders    Ambulatory referral to Gastroenterology          Subjective:      Patient ID: Edwin Pratt is a 60 y o  female  Patient has history of fibromyalgia  Neck Pain    This is a new problem  The current episode started in the past 7 days  The problem occurs constantly  The problem has been gradually worsening  The pain is associated with nothing  The pain is present in the right side  The quality of the pain is described as aching  The pain is at a severity of 7/10  The pain is moderate  The symptoms are aggravated by twisting  Pertinent negatives include no chest pain, fever, headaches, numbness, tingling or weakness  She has tried NSAIDs (Deep tissue massages,  Timothy Bump,  epson salt , ) for the symptoms  The treatment provided mild relief  The following portions of the patient's history were reviewed and updated as appropriate: allergies, current medications, past family history, past medical history, past social history, past surgical history and problem list     Review of Systems   Constitutional: Negative for fever  Cardiovascular: Negative for chest pain  Musculoskeletal: Positive for neck pain  Neurological: Negative for tingling, weakness, numbness and headaches  Objective:    Vitals:    07/05/18 1346   BP: 120/76   Pulse: 84   Resp: 16   Temp: (!) 97 3 °F (36 3 °C)        Physical Exam   Constitutional: She is oriented to person, place, and time  She appears well-developed and well-nourished  No distress  HENT:   Head: Normocephalic  Mouth/Throat: Oropharynx is clear and moist  No oropharyngeal exudate  Eyes: Conjunctivae are normal  Pupils are equal, round, and reactive to light  Neck: Normal range of motion  Cardiovascular: Normal rate, regular rhythm, normal heart sounds and intact distal pulses  Exam reveals no gallop and no friction rub  No murmur heard  Pulmonary/Chest: Effort normal and breath sounds normal  No respiratory distress  She has no wheezes  She has no rales  She exhibits no tenderness  Abdominal: Soft  She exhibits no distension and no mass  There is no tenderness  There is no rebound and no guarding  Musculoskeletal: Normal range of motion  She exhibits no edema or deformity  Cervical back: She exhibits tenderness and spasm  She exhibits normal range of motion  Tender over right trapezius muscle   Lymphadenopathy:     She has no cervical adenopathy  Neurological: She is alert and oriented to person, place, and time  Skin: Skin is warm and dry  No rash noted  She is not diaphoretic  No pallor  Psychiatric: She has a normal mood and affect  Vitals reviewed

## 2018-07-05 NOTE — ASSESSMENT & PLAN NOTE
One-week history,  No trauma,  Neurovascular intact  Will give Flexeril   ibuprofen for week   follow-up in a week   If no improvement was sent for PT

## 2020-11-16 ENCOUNTER — APPOINTMENT (OUTPATIENT)
Dept: RADIOLOGY | Age: 65
End: 2020-11-16
Payer: COMMERCIAL

## 2020-11-16 ENCOUNTER — OFFICE VISIT (OUTPATIENT)
Dept: URGENT CARE | Age: 65
End: 2020-11-16
Payer: COMMERCIAL

## 2020-11-16 VITALS
HEIGHT: 63 IN | DIASTOLIC BLOOD PRESSURE: 89 MMHG | RESPIRATION RATE: 16 BRPM | SYSTOLIC BLOOD PRESSURE: 169 MMHG | BODY MASS INDEX: 22.68 KG/M2 | TEMPERATURE: 97.2 F | OXYGEN SATURATION: 96 % | WEIGHT: 128 LBS | HEART RATE: 82 BPM

## 2020-11-16 DIAGNOSIS — M25.561 ACUTE PAIN OF RIGHT KNEE: ICD-10-CM

## 2020-11-16 DIAGNOSIS — M25.561 ACUTE PAIN OF RIGHT KNEE: Primary | ICD-10-CM

## 2020-11-16 PROCEDURE — 99213 OFFICE O/P EST LOW 20 MIN: CPT | Performed by: PHYSICIAN ASSISTANT

## 2020-11-16 PROCEDURE — 73564 X-RAY EXAM KNEE 4 OR MORE: CPT

## 2020-11-16 RX ORDER — LEVOTHYROXINE SODIUM 0.03 MG/1
TABLET ORAL
COMMUNITY
Start: 2020-10-27

## 2020-11-16 RX ORDER — ATORVASTATIN CALCIUM 10 MG/1
10 TABLET, FILM COATED ORAL DAILY
COMMUNITY
Start: 2020-01-31 | End: 2021-01-30